# Patient Record
Sex: MALE | Race: OTHER | HISPANIC OR LATINO
[De-identification: names, ages, dates, MRNs, and addresses within clinical notes are randomized per-mention and may not be internally consistent; named-entity substitution may affect disease eponyms.]

---
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
## 2023-04-10 ENCOUNTER — APPOINTMENT (OUTPATIENT)
Dept: INTERNAL MEDICINE | Facility: CLINIC | Age: 23
End: 2023-04-10

## 2023-04-21 ENCOUNTER — APPOINTMENT (OUTPATIENT)
Dept: INTERNAL MEDICINE | Facility: CLINIC | Age: 23
End: 2023-04-21
Payer: COMMERCIAL

## 2023-04-21 ENCOUNTER — NON-APPOINTMENT (OUTPATIENT)
Age: 23
End: 2023-04-21

## 2023-04-21 VITALS
DIASTOLIC BLOOD PRESSURE: 81 MMHG | TEMPERATURE: 97.1 F | OXYGEN SATURATION: 100 % | HEART RATE: 70 BPM | WEIGHT: 190 LBS | SYSTOLIC BLOOD PRESSURE: 128 MMHG | HEIGHT: 72 IN | BODY MASS INDEX: 25.73 KG/M2

## 2023-04-21 DIAGNOSIS — Z23 ENCOUNTER FOR IMMUNIZATION: ICD-10-CM

## 2023-04-21 DIAGNOSIS — Z11.3 ENCOUNTER FOR SCREENING FOR INFECTIONS WITH A PREDOMINANTLY SEXUAL MODE OF TRANSMISSION: ICD-10-CM

## 2023-04-21 PROCEDURE — 90715 TDAP VACCINE 7 YRS/> IM: CPT

## 2023-04-21 PROCEDURE — 99385 PREV VISIT NEW AGE 18-39: CPT | Mod: 25

## 2023-04-21 PROCEDURE — 90471 IMMUNIZATION ADMIN: CPT

## 2023-04-21 NOTE — END OF VISIT
[FreeTextEntry3] : 22 yo male with hx childhood asthma, allergies here to establish care.\par \par 1) HCM\par HIV testing today\par STI testing

## 2023-04-21 NOTE — HISTORY OF PRESENT ILLNESS
[FreeTextEntry1] : Establish care [de-identified] : Mr. Freire is a 23 y.o M with a PMHx of childhood asthma previously on albuterol inhalers, discontinued at age 13 presenting to clinic to establish PCP. Pt denies any sx at this time including fevers, chills, nausea, vomiting, diarrhea, CP, SOB, headaches, dysuria, hematuria. Pt says he takes Zyrtec and/or Claritin occasionally for seasonal allergies but otherwise takes no medications. Pt has NKDA, no surgical hx, a family hx pertinent for HTN and migraines in his mother, smokes marijuana daily since age 20, denies smoking hx, drinks an occasional beer 1x per month, and exercises by playing basketball/football 3x/week. Pt says he is not currently sexually active but had unprotected sexual intercourse several months ago and denies any urogenital sx. Pt consenting for STI screening at this time.

## 2023-04-21 NOTE — ASSESSMENT
[FreeTextEntry1] : Mr. Freire is a 23 y.o M with a PMHx of childhood asthma previously on albuterol inhalers, discontinued at age 13 presenting to clinic to establish PCP\par \par # Establish care \par Pt is a 23 y.o M with childhood asthma, otherwise in good health. No medications other than antihistamines for seasonal allergies, denies other allergies to medications/food. Pt currently employed as a . \par Plan: \par - Lipid panel performed today, f/u with results \par - TdAP vaccination administered today as pt previously unsure of vaccination status \par - Counseled pt on decreasing marijuana consumption during todays visit \par \par # STI screening\par Pt reports having unprotected sexual intercourse with female partner "several months ago", denies other sexual encounters, denies urogenital sx including but not equal to: discharge, dysuria, testicular pain. Pt consenting for adult STI screening during this encounter. \par Plan: \par - F/u with urine G/C screen, HIV, syphillis, and Hep C \par - Pt counseled on importance of "safe sex" during this visit

## 2024-07-01 ENCOUNTER — APPOINTMENT (OUTPATIENT)
Dept: INTERNAL MEDICINE | Facility: CLINIC | Age: 24
End: 2024-07-01
Payer: COMMERCIAL

## 2024-07-01 VITALS
WEIGHT: 185 LBS | DIASTOLIC BLOOD PRESSURE: 82 MMHG | HEART RATE: 82 BPM | OXYGEN SATURATION: 97 % | HEIGHT: 72 IN | BODY MASS INDEX: 25.06 KG/M2 | TEMPERATURE: 98 F | SYSTOLIC BLOOD PRESSURE: 138 MMHG

## 2024-07-01 DIAGNOSIS — D56.9 THALASSEMIA, UNSPECIFIED: ICD-10-CM

## 2024-07-01 DIAGNOSIS — Z00.00 ENCOUNTER FOR GENERAL ADULT MEDICAL EXAMINATION W/OUT ABNORMAL FINDINGS: ICD-10-CM

## 2024-07-01 PROCEDURE — 36415 COLL VENOUS BLD VENIPUNCTURE: CPT

## 2024-07-01 PROCEDURE — 99395 PREV VISIT EST AGE 18-39: CPT | Mod: 25,GC

## 2024-07-02 ENCOUNTER — NON-APPOINTMENT (OUTPATIENT)
Age: 24
End: 2024-07-02

## 2024-07-02 LAB
HCV AB SER QL: NONREACTIVE
HCV S/CO RATIO: 0.13 S/CO
HGB A MFR BLD: 97.6 %
HGB A2 MFR BLD: 2.4 %
HGB FRACT BLD-IMP: NORMAL
HIV1+2 AB SPEC QL IA.RAPID: NONREACTIVE

## 2024-07-22 ENCOUNTER — APPOINTMENT (OUTPATIENT)
Dept: INTERNAL MEDICINE | Facility: CLINIC | Age: 24
End: 2024-07-22